# Patient Record
Sex: FEMALE | Race: BLACK OR AFRICAN AMERICAN | NOT HISPANIC OR LATINO | URBAN - METROPOLITAN AREA
[De-identification: names, ages, dates, MRNs, and addresses within clinical notes are randomized per-mention and may not be internally consistent; named-entity substitution may affect disease eponyms.]

---

## 2020-07-25 ENCOUNTER — TELEPHONE ENCOUNTER (OUTPATIENT)
Dept: URBAN - METROPOLITAN AREA CLINIC 13 | Facility: CLINIC | Age: 80
End: 2020-07-25

## 2020-07-25 RX ORDER — PRAVASTATIN SODIUM 40 MG/1
TAKE 1 TABLET DAILY TABLET ORAL
Refills: 0 | OUTPATIENT
Start: 2015-02-13 | End: 2015-06-29

## 2020-07-25 RX ORDER — OMEPRAZOLE 20 MG/1
TAKE 1 CAPSULE BY MOUTH EVERY DAY CAPSULE, DELAYED RELEASE ORAL
Qty: 30 | Refills: 11 | OUTPATIENT
Start: 2019-08-12 | End: 2019-09-05

## 2020-07-25 RX ORDER — ROSUVASTATIN CALCIUM 5 MG
TAKE 1 TABLET DAILY TABLET ORAL
Refills: 0 | OUTPATIENT
End: 2016-12-07

## 2020-07-25 RX ORDER — RALOXIFENE HYDROCHLORIDE 60 MG/1
TAKE 1 TABLET DAILY TABLET, FILM COATED ORAL
Refills: 0 | OUTPATIENT
Start: 2014-09-16 | End: 2017-06-06

## 2020-07-25 RX ORDER — ROSUVASTATIN CALCIUM 5 MG
TABLET ORAL
Qty: 30 | Refills: 0 | OUTPATIENT
Start: 2012-12-17 | End: 2015-05-14

## 2020-07-25 RX ORDER — DICYCLOMINE HYDROCHLORIDE 10 MG/1
TAKE 1 CAPSULE DAILY CAPSULE ORAL
Refills: 0 | OUTPATIENT
End: 2016-12-07

## 2020-07-25 RX ORDER — PANTOPRAZOLE SODIUM 40 MG/1
TAKE 1 TABLET DAILY TABLET, DELAYED RELEASE ORAL
Qty: 90 | Refills: 3 | OUTPATIENT
Start: 2017-02-02 | End: 2019-08-12

## 2020-07-26 ENCOUNTER — TELEPHONE ENCOUNTER (OUTPATIENT)
Dept: URBAN - METROPOLITAN AREA CLINIC 13 | Facility: CLINIC | Age: 80
End: 2020-07-26

## 2020-07-26 RX ORDER — HYDROCODONE BITARTRATE AND ACETAMINOPHEN 5; 325 MG/1; MG/1
TABLET ORAL
Qty: 20 | Refills: 0 | Status: ACTIVE | COMMUNITY
Start: 2015-06-12

## 2020-07-26 RX ORDER — IBUPROFEN 800 MG/1
TAKE 1 TABLET BY MOUTH 3 TIMES A DAY AS NEEDED TABLET ORAL
Qty: 18 | Refills: 0 | Status: ACTIVE | COMMUNITY
Start: 2012-03-24

## 2020-07-26 RX ORDER — TRAMADOL HYDROCHLORIDE AND ACETAMINOPHEN 37.5; 325 MG/1; MG/1
TABLET, FILM COATED ORAL
Qty: 28 | Refills: 0 | Status: ACTIVE | COMMUNITY
Start: 2014-08-14

## 2020-07-26 RX ORDER — LEVOFLOXACIN 500 MG/1
TABLET, FILM COATED ORAL
Qty: 10 | Refills: 0 | Status: ACTIVE | COMMUNITY
Start: 2013-01-16

## 2020-07-26 RX ORDER — NITROFURANTOIN MONOHYDRATE/MACROCRYSTALLINE 25; 75 MG/1; MG/1
CAPSULE ORAL
Qty: 14 | Refills: 0 | Status: ACTIVE | COMMUNITY
Start: 2012-08-17

## 2020-07-26 RX ORDER — CHLORHEXIDINE GLUCONATE 0.12% ORAL RINSE 1.2 MG/ML
SOLUTION ORAL
Qty: 255 | Refills: 0 | Status: ACTIVE | COMMUNITY
Start: 2015-06-12

## 2020-07-26 RX ORDER — AZITHROMYCIN DIHYDRATE 250 MG/1
TABLET, FILM COATED ORAL
Qty: 6 | Refills: 0 | Status: ACTIVE | COMMUNITY
Start: 2014-02-10

## 2020-07-26 RX ORDER — VENLAFAXINE HYDROCHLORIDE 75 MG/1
CAPSULE, EXTENDED RELEASE ORAL
Qty: 30 | Refills: 0 | Status: ACTIVE | COMMUNITY
Start: 2018-10-07

## 2020-07-26 RX ORDER — CLINDAMYCIN HYDROCHLORIDE 300 MG/1
CAPSULE ORAL
Qty: 28 | Refills: 0 | Status: ACTIVE | COMMUNITY
Start: 2015-06-26

## 2020-07-26 RX ORDER — OMEPRAZOLE 20 MG/1
TAKE 1 CAPSULE BY MOUTH EVERY DAY CAPSULE, DELAYED RELEASE ORAL
Qty: 90 | Refills: 4 | Status: ACTIVE | COMMUNITY
Start: 2019-09-05

## 2020-07-26 RX ORDER — AMOXICILLIN 500 MG/1
CAPSULE ORAL
Qty: 21 | Refills: 0 | Status: ACTIVE | COMMUNITY
Start: 2015-06-12

## 2020-07-26 RX ORDER — AMOXICILLIN AND CLAVULANATE POTASSIUM 875; 125 MG/1; MG/1
TAKE 1 TABLET BY MOUTH TWICE A DAY UNTIL FINISHED TABLET, FILM COATED ORAL
Qty: 20 | Refills: 0 | Status: ACTIVE | COMMUNITY
Start: 2012-03-24

## 2020-07-26 RX ORDER — PREDNISONE 20 MG/1
TABLET ORAL
Qty: 6 | Refills: 0 | Status: ACTIVE | COMMUNITY
Start: 2019-07-05

## 2020-07-26 RX ORDER — AMOXICILLIN AND CLAVULANATE POTASSIUM 875; 125 MG/1; MG/1
TABLET, FILM COATED ORAL
Qty: 20 | Refills: 0 | Status: ACTIVE | COMMUNITY
Start: 2012-09-17

## 2020-07-26 RX ORDER — LACTOBACIL 2/BIFIDO 1/S.THERMO 450B CELL
TAKE 1 CAPSULE DAILY PRN PACKET (EA) ORAL
Refills: 0 | Status: ACTIVE | COMMUNITY

## 2020-07-26 RX ORDER — HYDROCODONE BITARTRATE AND ACETAMINOPHEN 5; 325 MG/1; MG/1
TABLET ORAL
Qty: 15 | Refills: 0 | Status: ACTIVE | COMMUNITY
Start: 2014-01-27

## 2020-07-26 RX ORDER — ROSUVASTATIN CALCIUM 5 MG
TABLET ORAL
Qty: 30 | Refills: 0 | Status: ACTIVE | COMMUNITY
Start: 2012-12-17

## 2020-07-26 RX ORDER — RALOXIFENE HCL 60 MG
TABLET ORAL
Qty: 30 | Refills: 0 | Status: ACTIVE | COMMUNITY
Start: 2012-08-23

## 2020-07-26 RX ORDER — RALOXIFENE HCL 60 MG
TABLET ORAL
Qty: 30 | Refills: 0 | Status: ACTIVE | COMMUNITY
Start: 2013-09-10

## 2020-07-26 RX ORDER — IBUPROFEN 800 MG/1
TABLET ORAL
Qty: 18 | Refills: 0 | Status: ACTIVE | COMMUNITY
Start: 2012-09-17

## 2020-07-26 RX ORDER — AMLODIPINE BESYLATE 2.5 MG/1
1 TAB BY MOUTH EVERY DAY FOR BLOOD PRESSURE TABLET ORAL
Qty: 30 | Refills: 0 | Status: ACTIVE | COMMUNITY
Start: 2012-03-03

## 2020-07-26 RX ORDER — ALBUTEROL SULFATE 90 UG/1
AEROSOL, METERED RESPIRATORY (INHALATION)
Qty: 18 | Refills: 0 | Status: ACTIVE | COMMUNITY
Start: 2013-01-16

## 2020-07-26 RX ORDER — MUPIROCIN 20 MG/G
OINTMENT TOPICAL
Qty: 22 | Refills: 0 | Status: ACTIVE | COMMUNITY
Start: 2013-11-05

## 2020-07-26 RX ORDER — GABAPENTIN 100 MG/1
TAKE 2 CAPSULES BY MOUTH TWICE DAILY CAPSULE ORAL
Qty: 40 | Refills: 0 | Status: ACTIVE | COMMUNITY
Start: 2019-07-05

## 2020-07-26 RX ORDER — VALACYCLOVIR HYDROCHLORIDE 500 MG/1
TABLET, FILM COATED ORAL
Qty: 42 | Refills: 0 | Status: ACTIVE | COMMUNITY
Start: 2019-07-05

## 2020-07-26 RX ORDER — AMOXICILLIN 500 MG/1
CAPSULE ORAL
Qty: 30 | Refills: 0 | Status: ACTIVE | COMMUNITY
Start: 2019-01-25

## 2020-07-26 RX ORDER — MONTELUKAST SODIUM 10 MG/1
TABLET, FILM COATED ORAL
Qty: 30 | Refills: 0 | Status: ACTIVE | COMMUNITY
Start: 2019-01-25

## 2020-07-26 RX ORDER — ONDANSETRON 4 MG/1
TAKE 1 TABLET EVERY 8 HOURS PRN NAUSEA TABLET, ORALLY DISINTEGRATING ORAL
Qty: 20 | Refills: 1 | Status: ACTIVE | COMMUNITY
Start: 2019-05-08

## 2020-07-26 RX ORDER — IBUPROFEN 800 MG/1
TABLET ORAL
Qty: 21 | Refills: 0 | Status: ACTIVE | COMMUNITY
Start: 2015-06-12

## 2021-06-17 ENCOUNTER — OFFICE VISIT (OUTPATIENT)
Dept: URBAN - METROPOLITAN AREA CLINIC 107 | Facility: CLINIC | Age: 81
End: 2021-06-17
Payer: MEDICARE

## 2021-06-17 VITALS
TEMPERATURE: 98.2 F | BODY MASS INDEX: 34.23 KG/M2 | HEIGHT: 62 IN | HEART RATE: 93 BPM | WEIGHT: 186 LBS | SYSTOLIC BLOOD PRESSURE: 124 MMHG | RESPIRATION RATE: 18 BRPM | DIASTOLIC BLOOD PRESSURE: 78 MMHG

## 2021-06-17 DIAGNOSIS — F41.8 ANXIETY ABOUT HEALTH: ICD-10-CM

## 2021-06-17 DIAGNOSIS — R07.89 ATYPICAL CHEST PAIN: ICD-10-CM

## 2021-06-17 PROCEDURE — 99213 OFFICE O/P EST LOW 20 MIN: CPT | Performed by: INTERNAL MEDICINE

## 2021-06-17 RX ORDER — TRAMADOL HYDROCHLORIDE AND ACETAMINOPHEN 37.5; 325 MG/1; MG/1
TABLET, FILM COATED ORAL
Qty: 28 | Refills: 0 | Status: ON HOLD | COMMUNITY
Start: 2014-08-14

## 2021-06-17 RX ORDER — VALACYCLOVIR HYDROCHLORIDE 500 MG/1
TABLET, FILM COATED ORAL
Qty: 42 | Refills: 0 | Status: ON HOLD | COMMUNITY
Start: 2019-07-05

## 2021-06-17 RX ORDER — IBUPROFEN 800 MG/1
TAKE 1 TABLET BY MOUTH 3 TIMES A DAY AS NEEDED TABLET ORAL
Qty: 18 | Refills: 0 | Status: ON HOLD | COMMUNITY
Start: 2012-03-24

## 2021-06-17 RX ORDER — HYDROCODONE BITARTRATE AND ACETAMINOPHEN 5; 325 MG/1; MG/1
TABLET ORAL
Qty: 15 | Refills: 0 | Status: ON HOLD | COMMUNITY
Start: 2014-01-27

## 2021-06-17 RX ORDER — OMEPRAZOLE 20 MG/1
TAKE 1 CAPSULE BY MOUTH EVERY DAY CAPSULE, DELAYED RELEASE ORAL ONCE A DAY
Qty: 90 | Refills: 3 | OUTPATIENT

## 2021-06-17 RX ORDER — OMEPRAZOLE 20 MG/1
TAKE 1 CAPSULE BY MOUTH EVERY DAY CAPSULE, DELAYED RELEASE ORAL
Qty: 90 | Refills: 4 | Status: ACTIVE | COMMUNITY
Start: 2019-09-05

## 2021-06-17 RX ORDER — CHLORHEXIDINE GLUCONATE 0.12% ORAL RINSE 1.2 MG/ML
SOLUTION ORAL
Qty: 255 | Refills: 0 | Status: ON HOLD | COMMUNITY
Start: 2015-06-12

## 2021-06-17 RX ORDER — ROSUVASTATIN CALCIUM 5 MG
TABLET ORAL
Qty: 30 | Refills: 0 | Status: ON HOLD | COMMUNITY
Start: 2012-12-17

## 2021-06-17 RX ORDER — TRAZODONE HYDROCHLORIDE 150 MG/1
1 TABLET AT BEDTIME TABLET ORAL ONCE A DAY
Status: ACTIVE | COMMUNITY

## 2021-06-17 RX ORDER — AMLODIPINE BESYLATE 5 MG/1
1 TAB BY MOUTH EVERY DAY FOR BLOOD PRESSURE TABLET ORAL ONCE A DAY
Refills: 0 | Status: ACTIVE | COMMUNITY
Start: 2012-03-03

## 2021-06-17 RX ORDER — MUPIROCIN 20 MG/G
OINTMENT TOPICAL
Qty: 22 | Refills: 0 | Status: ON HOLD | COMMUNITY
Start: 2013-11-05

## 2021-06-17 RX ORDER — AMOXICILLIN AND CLAVULANATE POTASSIUM 875; 125 MG/1; MG/1
TAKE 1 TABLET BY MOUTH TWICE A DAY UNTIL FINISHED TABLET, FILM COATED ORAL
Qty: 20 | Refills: 0 | Status: ON HOLD | COMMUNITY
Start: 2012-03-24

## 2021-06-17 RX ORDER — VENLAFAXINE HYDROCHLORIDE 150 MG/1
1 CAPSULE WITH FOOD CAPSULE, EXTENDED RELEASE ORAL ONCE A DAY
Refills: 0 | Status: ACTIVE | COMMUNITY
Start: 2018-10-07

## 2021-06-17 RX ORDER — CLINDAMYCIN HYDROCHLORIDE 300 MG/1
CAPSULE ORAL
Qty: 28 | Refills: 0 | Status: ON HOLD | COMMUNITY
Start: 2015-06-26

## 2021-06-17 RX ORDER — NITROFURANTOIN MONOHYDRATE/MACROCRYSTALLINE 25; 75 MG/1; MG/1
CAPSULE ORAL
Qty: 14 | Refills: 0 | Status: ON HOLD | COMMUNITY
Start: 2012-08-17

## 2021-06-17 RX ORDER — MONTELUKAST SODIUM 10 MG/1
TABLET, FILM COATED ORAL
Qty: 30 | Refills: 0 | Status: ON HOLD | COMMUNITY
Start: 2019-01-25

## 2021-06-17 RX ORDER — GABAPENTIN 100 MG/1
TAKE 2 CAPSULES BY MOUTH TWICE DAILY CAPSULE ORAL
Qty: 40 | Refills: 0 | Status: ON HOLD | COMMUNITY
Start: 2019-07-05

## 2021-06-17 RX ORDER — RALOXIFENE HCL 60 MG
TABLET ORAL
Qty: 30 | Refills: 0 | Status: ON HOLD | COMMUNITY
Start: 2012-08-23

## 2021-06-17 RX ORDER — LACTOBACIL 2/BIFIDO 1/S.THERMO 450B CELL
TAKE 1 CAPSULE DAILY PRN PACKET (EA) ORAL
Refills: 0 | Status: ON HOLD | COMMUNITY

## 2021-06-17 RX ORDER — PREDNISONE 20 MG/1
TABLET ORAL
Qty: 6 | Refills: 0 | Status: ON HOLD | COMMUNITY
Start: 2019-07-05

## 2021-06-17 RX ORDER — AMOXICILLIN 500 MG/1
CAPSULE ORAL
Qty: 21 | Refills: 0 | Status: ON HOLD | COMMUNITY
Start: 2015-06-12

## 2021-06-17 RX ORDER — AZITHROMYCIN DIHYDRATE 250 MG/1
TABLET, FILM COATED ORAL
Qty: 6 | Refills: 0 | Status: ON HOLD | COMMUNITY
Start: 2014-02-10

## 2021-06-17 RX ORDER — LEVOFLOXACIN 500 MG/1
TABLET, FILM COATED ORAL
Qty: 10 | Refills: 0 | Status: ON HOLD | COMMUNITY
Start: 2013-01-16

## 2021-06-17 RX ORDER — ONDANSETRON 4 MG/1
TAKE 1 TABLET EVERY 8 HOURS PRN NAUSEA TABLET, ORALLY DISINTEGRATING ORAL
Qty: 20 | Refills: 1 | Status: ON HOLD | COMMUNITY
Start: 2019-05-08

## 2021-06-17 RX ORDER — ALBUTEROL SULFATE 90 UG/1
AEROSOL, METERED RESPIRATORY (INHALATION)
Qty: 18 | Refills: 0 | Status: ON HOLD | COMMUNITY
Start: 2013-01-16

## 2021-06-17 NOTE — HPI-TODAY'S VISIT:
80-year-old woman with a history of IBS and reflux, presenting for esophageal irritation and atypical chest pain. She was last seen in the office in August 2019.  She was to resume omeprazole 20 mg daily.  If symptoms were persistent or not improved, an endoscopy was to be considered.  She complains of left sided chest pain radiating to her shoulder and down her left arm. She reports that she has had a cardiac workup, which was normal. She has been taking omeprazole 20 mg daily. This has not made any improvement in her pain. She reports that she has terrible pain in her shoulder and chest that starts ssubsternally. She reports when it radiates down the arm, she is unable to lift her arm. She has not identified any food triggers. Aleve does help the pain somewhat. She reports increased stress with her new neighbors. The [place] [id number] housing community has moved next door. She has been harassed by her neighbors. She is tearful throughout today's visit. She has called the police about all of her troubles, and she was told that there was nothing that could be done since there is not any evidence. She does not want to leave her home. She just wants to live in peace.

## 2021-09-13 ENCOUNTER — OFFICE VISIT (OUTPATIENT)
Dept: URBAN - METROPOLITAN AREA CLINIC 107 | Facility: CLINIC | Age: 81
End: 2021-09-13
Payer: MEDICARE

## 2021-09-13 VITALS
WEIGHT: 182 LBS | SYSTOLIC BLOOD PRESSURE: 151 MMHG | RESPIRATION RATE: 18 BRPM | BODY MASS INDEX: 33.49 KG/M2 | HEIGHT: 62 IN | DIASTOLIC BLOOD PRESSURE: 94 MMHG | HEART RATE: 95 BPM | TEMPERATURE: 98 F

## 2021-09-13 DIAGNOSIS — K21.9 GERD WITHOUT ESOPHAGITIS: ICD-10-CM

## 2021-09-13 DIAGNOSIS — R07.89 ATYPICAL CHEST PAIN: ICD-10-CM

## 2021-09-13 PROBLEM — 102589003: Status: ACTIVE | Noted: 2021-06-17

## 2021-09-13 PROBLEM — 266435005: Status: ACTIVE | Noted: 2021-09-13

## 2021-09-13 PROCEDURE — 99213 OFFICE O/P EST LOW 20 MIN: CPT | Performed by: NURSE PRACTITIONER

## 2021-09-13 RX ORDER — PREDNISONE 20 MG/1
TABLET ORAL
Qty: 6 | Refills: 0 | Status: ON HOLD | COMMUNITY
Start: 2019-07-05

## 2021-09-13 RX ORDER — RALOXIFENE HCL 60 MG
TABLET ORAL
Qty: 30 | Refills: 0 | Status: ON HOLD | COMMUNITY
Start: 2012-08-23

## 2021-09-13 RX ORDER — OMEPRAZOLE 20 MG/1
TAKE 1 CAPSULE BY MOUTH EVERY DAY CAPSULE, DELAYED RELEASE ORAL ONCE A DAY
Qty: 90 | Refills: 3

## 2021-09-13 RX ORDER — ALBUTEROL SULFATE 90 UG/1
AEROSOL, METERED RESPIRATORY (INHALATION)
Qty: 18 | Refills: 0 | Status: ON HOLD | COMMUNITY
Start: 2013-01-16

## 2021-09-13 RX ORDER — VALACYCLOVIR HYDROCHLORIDE 500 MG/1
TABLET, FILM COATED ORAL
Qty: 42 | Refills: 0 | Status: ON HOLD | COMMUNITY
Start: 2019-07-05

## 2021-09-13 RX ORDER — MONTELUKAST SODIUM 10 MG/1
TABLET, FILM COATED ORAL
Qty: 30 | Refills: 0 | Status: ON HOLD | COMMUNITY
Start: 2019-01-25

## 2021-09-13 RX ORDER — AMLODIPINE BESYLATE 5 MG/1
1 TAB BY MOUTH EVERY DAY FOR BLOOD PRESSURE TABLET ORAL ONCE A DAY
Refills: 0 | Status: ACTIVE | COMMUNITY
Start: 2012-03-03

## 2021-09-13 RX ORDER — ROSUVASTATIN CALCIUM 5 MG
TABLET ORAL
Qty: 30 | Refills: 0 | Status: ON HOLD | COMMUNITY
Start: 2012-12-17

## 2021-09-13 RX ORDER — VENLAFAXINE HYDROCHLORIDE 150 MG/1
1 CAPSULE WITH FOOD CAPSULE, EXTENDED RELEASE ORAL ONCE A DAY
Refills: 0 | Status: ACTIVE | COMMUNITY
Start: 2018-10-07

## 2021-09-13 RX ORDER — HYDROCODONE BITARTRATE AND ACETAMINOPHEN 5; 325 MG/1; MG/1
TABLET ORAL
Qty: 15 | Refills: 0 | Status: ON HOLD | COMMUNITY
Start: 2014-01-27

## 2021-09-13 RX ORDER — AZITHROMYCIN DIHYDRATE 250 MG/1
TABLET, FILM COATED ORAL
Qty: 6 | Refills: 0 | Status: ON HOLD | COMMUNITY
Start: 2014-02-10

## 2021-09-13 RX ORDER — OMEPRAZOLE 20 MG/1
TAKE 1 CAPSULE BY MOUTH EVERY DAY CAPSULE, DELAYED RELEASE ORAL ONCE A DAY
Qty: 90 | Refills: 3 | Status: ACTIVE | COMMUNITY

## 2021-09-13 RX ORDER — LEVOFLOXACIN 500 MG/1
TABLET, FILM COATED ORAL
Qty: 10 | Refills: 0 | Status: ON HOLD | COMMUNITY
Start: 2013-01-16

## 2021-09-13 RX ORDER — CHLORHEXIDINE GLUCONATE 0.12% ORAL RINSE 1.2 MG/ML
SOLUTION ORAL
Qty: 255 | Refills: 0 | Status: ON HOLD | COMMUNITY
Start: 2015-06-12

## 2021-09-13 RX ORDER — NITROFURANTOIN MONOHYDRATE/MACROCRYSTALLINE 25; 75 MG/1; MG/1
CAPSULE ORAL
Qty: 14 | Refills: 0 | Status: ON HOLD | COMMUNITY
Start: 2012-08-17

## 2021-09-13 RX ORDER — AMOXICILLIN AND CLAVULANATE POTASSIUM 875; 125 MG/1; MG/1
TAKE 1 TABLET BY MOUTH TWICE A DAY UNTIL FINISHED TABLET, FILM COATED ORAL
Qty: 20 | Refills: 0 | Status: ON HOLD | COMMUNITY
Start: 2012-03-24

## 2021-09-13 RX ORDER — TRAMADOL HYDROCHLORIDE AND ACETAMINOPHEN 37.5; 325 MG/1; MG/1
TABLET, FILM COATED ORAL
Qty: 28 | Refills: 0 | Status: ON HOLD | COMMUNITY
Start: 2014-08-14

## 2021-09-13 RX ORDER — CLINDAMYCIN HYDROCHLORIDE 300 MG/1
CAPSULE ORAL
Qty: 28 | Refills: 0 | Status: ON HOLD | COMMUNITY
Start: 2015-06-26

## 2021-09-13 RX ORDER — AMOXICILLIN 500 MG/1
CAPSULE ORAL
Qty: 21 | Refills: 0 | Status: ON HOLD | COMMUNITY
Start: 2015-06-12

## 2021-09-13 RX ORDER — TRAZODONE HYDROCHLORIDE 150 MG/1
1 TABLET AT BEDTIME TABLET ORAL ONCE A DAY
Status: ACTIVE | COMMUNITY

## 2021-09-13 RX ORDER — ONDANSETRON 4 MG/1
TAKE 1 TABLET EVERY 8 HOURS PRN NAUSEA TABLET, ORALLY DISINTEGRATING ORAL
Qty: 20 | Refills: 1 | Status: ON HOLD | COMMUNITY
Start: 2019-05-08

## 2021-09-13 RX ORDER — GABAPENTIN 100 MG/1
TAKE 2 CAPSULES BY MOUTH TWICE DAILY CAPSULE ORAL
Qty: 40 | Refills: 0 | Status: ON HOLD | COMMUNITY
Start: 2019-07-05

## 2021-09-13 RX ORDER — LACTOBACIL 2/BIFIDO 1/S.THERMO 450B CELL
TAKE 1 CAPSULE DAILY PRN PACKET (EA) ORAL
Refills: 0 | Status: ON HOLD | COMMUNITY

## 2021-09-13 RX ORDER — MUPIROCIN 20 MG/G
OINTMENT TOPICAL
Qty: 22 | Refills: 0 | Status: ON HOLD | COMMUNITY
Start: 2013-11-05

## 2021-09-13 RX ORDER — IBUPROFEN 800 MG/1
TAKE 1 TABLET BY MOUTH 3 TIMES A DAY AS NEEDED TABLET ORAL
Qty: 18 | Refills: 0 | Status: ON HOLD | COMMUNITY
Start: 2012-03-24

## 2021-09-13 NOTE — HPI-TODAY'S VISIT:
80 yo female with a history of IBS and acid reflux, presenting for medication refill.  She tells me that she is doing fairly well. She continues with emotional/social  struggles with her neighbors. She denies any trouble swallowing. She does continue with acid reflux with chest pain radiating down her arm. She tells me that she had a complete physical with her PCP, and was told that she is healthy with the exception of arthritis. Her stress is exacerbating the majority of her issues.  There is rare heartburn. She has been taking omeprazole 20 mg daily. There is no abdominal pain, nausea or vomiting. Her bowels are moving normally. No blood per rectum. Her weight loss. Her appetite is fair.

## 2021-11-04 ENCOUNTER — TELEPHONE ENCOUNTER (OUTPATIENT)
Dept: URBAN - METROPOLITAN AREA CLINIC 113 | Facility: CLINIC | Age: 81
End: 2021-11-04

## 2021-11-04 RX ORDER — OMEPRAZOLE 20 MG/1
TAKE 1 CAPSULE BY MOUTH EVERY DAY CAPSULE, DELAYED RELEASE ORAL ONCE A DAY
Qty: 90 | Refills: 3

## 2023-04-28 ENCOUNTER — OFFICE VISIT (OUTPATIENT)
Dept: URBAN - METROPOLITAN AREA CLINIC 113 | Facility: CLINIC | Age: 83
End: 2023-04-28
Payer: MEDICARE

## 2023-04-28 ENCOUNTER — DASHBOARD ENCOUNTERS (OUTPATIENT)
Age: 83
End: 2023-04-28

## 2023-04-28 VITALS
HEART RATE: 97 BPM | SYSTOLIC BLOOD PRESSURE: 122 MMHG | HEIGHT: 62 IN | RESPIRATION RATE: 18 BRPM | BODY MASS INDEX: 33.23 KG/M2 | WEIGHT: 180.6 LBS | TEMPERATURE: 97.5 F | DIASTOLIC BLOOD PRESSURE: 75 MMHG

## 2023-04-28 DIAGNOSIS — K21.9 GERD WITHOUT ESOPHAGITIS: ICD-10-CM

## 2023-04-28 PROCEDURE — 99213 OFFICE O/P EST LOW 20 MIN: CPT | Performed by: NURSE PRACTITIONER

## 2023-04-28 RX ORDER — VALACYCLOVIR HYDROCHLORIDE 500 MG/1
TABLET, FILM COATED ORAL
Qty: 42 | Refills: 0 | Status: ON HOLD | COMMUNITY
Start: 2019-07-05

## 2023-04-28 RX ORDER — MONTELUKAST SODIUM 10 MG/1
TABLET, FILM COATED ORAL
Qty: 30 | Refills: 0 | Status: ON HOLD | COMMUNITY
Start: 2019-01-25

## 2023-04-28 RX ORDER — HYDROCODONE BITARTRATE AND ACETAMINOPHEN 5; 325 MG/1; MG/1
TABLET ORAL
Qty: 15 | Refills: 0 | Status: ON HOLD | COMMUNITY
Start: 2014-01-27

## 2023-04-28 RX ORDER — PREDNISONE 20 MG/1
TABLET ORAL
Qty: 6 | Refills: 0 | Status: ON HOLD | COMMUNITY
Start: 2019-07-05

## 2023-04-28 RX ORDER — TRAMADOL HYDROCHLORIDE AND ACETAMINOPHEN 37.5; 325 MG/1; MG/1
TABLET, FILM COATED ORAL
Qty: 28 | Refills: 0 | Status: ON HOLD | COMMUNITY
Start: 2014-08-14

## 2023-04-28 RX ORDER — IBUPROFEN 800 MG/1
TAKE 1 TABLET BY MOUTH 3 TIMES A DAY AS NEEDED TABLET ORAL
Qty: 18 | Refills: 0 | Status: ON HOLD | COMMUNITY
Start: 2012-03-24

## 2023-04-28 RX ORDER — OMEPRAZOLE 20 MG/1
TAKE 1 CAPSULE BY MOUTH EVERY DAY CAPSULE, DELAYED RELEASE ORAL ONCE A DAY
Qty: 90 | Refills: 3 | Status: ACTIVE | COMMUNITY

## 2023-04-28 RX ORDER — ONDANSETRON 4 MG/1
TAKE 1 TABLET EVERY 8 HOURS PRN NAUSEA TABLET, ORALLY DISINTEGRATING ORAL
Qty: 20 | Refills: 1 | Status: ON HOLD | COMMUNITY
Start: 2019-05-08

## 2023-04-28 RX ORDER — NITROFURANTOIN MONOHYDRATE/MACROCRYSTALLINE 25; 75 MG/1; MG/1
CAPSULE ORAL
Qty: 14 | Refills: 0 | Status: ON HOLD | COMMUNITY
Start: 2012-08-17

## 2023-04-28 RX ORDER — OMEPRAZOLE 20 MG/1
TAKE 1 CAPSULE BY MOUTH EVERY DAY CAPSULE, DELAYED RELEASE ORAL ONCE A DAY
Qty: 90 | Refills: 3

## 2023-04-28 RX ORDER — AMLODIPINE BESYLATE 5 MG/1
1 TAB BY MOUTH EVERY DAY FOR BLOOD PRESSURE TABLET ORAL ONCE A DAY
Refills: 0 | Status: ACTIVE | COMMUNITY
Start: 2012-03-03

## 2023-04-28 RX ORDER — ROSUVASTATIN CALCIUM 5 MG
TABLET ORAL
Qty: 30 | Refills: 0 | Status: ON HOLD | COMMUNITY
Start: 2012-12-17

## 2023-04-28 RX ORDER — AMOXICILLIN 500 MG/1
CAPSULE ORAL
Qty: 21 | Refills: 0 | Status: ON HOLD | COMMUNITY
Start: 2015-06-12

## 2023-04-28 RX ORDER — VENLAFAXINE HYDROCHLORIDE 150 MG/1
1 CAPSULE WITH FOOD CAPSULE, EXTENDED RELEASE ORAL ONCE A DAY
Refills: 0 | Status: ACTIVE | COMMUNITY
Start: 2018-10-07

## 2023-04-28 RX ORDER — RALOXIFENE HCL 60 MG
TABLET ORAL
Qty: 30 | Refills: 0 | Status: ON HOLD | COMMUNITY
Start: 2012-08-23

## 2023-04-28 RX ORDER — ALBUTEROL SULFATE 90 UG/1
AEROSOL, METERED RESPIRATORY (INHALATION)
Qty: 18 | Refills: 0 | Status: ON HOLD | COMMUNITY
Start: 2013-01-16

## 2023-04-28 RX ORDER — ATORVASTATIN CALCIUM 40 MG/1
1 TABLET TABLET, FILM COATED ORAL ONCE A DAY
Status: ACTIVE | COMMUNITY

## 2023-04-28 RX ORDER — LEVOFLOXACIN 500 MG/1
TABLET, FILM COATED ORAL
Qty: 10 | Refills: 0 | Status: ON HOLD | COMMUNITY
Start: 2013-01-16

## 2023-04-28 RX ORDER — MUPIROCIN 20 MG/G
OINTMENT TOPICAL
Qty: 22 | Refills: 0 | Status: ON HOLD | COMMUNITY
Start: 2013-11-05

## 2023-04-28 RX ORDER — AZITHROMYCIN DIHYDRATE 250 MG/1
TABLET, FILM COATED ORAL
Qty: 6 | Refills: 0 | Status: ON HOLD | COMMUNITY
Start: 2014-02-10

## 2023-04-28 RX ORDER — TRAZODONE HYDROCHLORIDE 150 MG/1
1 TABLET AT BEDTIME TABLET ORAL ONCE A DAY
Status: ACTIVE | COMMUNITY

## 2023-04-28 RX ORDER — CLINDAMYCIN HYDROCHLORIDE 300 MG/1
CAPSULE ORAL
Qty: 28 | Refills: 0 | Status: ON HOLD | COMMUNITY
Start: 2015-06-26

## 2023-04-28 RX ORDER — AMOXICILLIN AND CLAVULANATE POTASSIUM 875; 125 MG/1; MG/1
TAKE 1 TABLET BY MOUTH TWICE A DAY UNTIL FINISHED TABLET, FILM COATED ORAL
Qty: 20 | Refills: 0 | Status: ON HOLD | COMMUNITY
Start: 2012-03-24

## 2023-04-28 RX ORDER — LACTOBACIL 2/BIFIDO 1/S.THERMO 450B CELL
TAKE 1 CAPSULE DAILY PRN PACKET (EA) ORAL
Refills: 0 | Status: ON HOLD | COMMUNITY

## 2023-04-28 RX ORDER — CHLORHEXIDINE GLUCONATE 0.12% ORAL RINSE 1.2 MG/ML
SOLUTION ORAL
Qty: 255 | Refills: 0 | Status: ON HOLD | COMMUNITY
Start: 2015-06-12

## 2023-04-28 RX ORDER — GABAPENTIN 100 MG/1
TAKE 2 CAPSULES BY MOUTH TWICE DAILY CAPSULE ORAL
Qty: 40 | Refills: 0 | Status: ON HOLD | COMMUNITY
Start: 2019-07-05

## 2023-04-28 RX ORDER — CLOPIDOGREL 75 MG/1
1 TABLET TABLET, FILM COATED ORAL ONCE A DAY
Status: ACTIVE | COMMUNITY

## 2023-04-28 NOTE — HPI-TODAY'S VISIT:
82-year-old woman presenting for long interval follow-up regarding atypical chest pain and GERD, as well as medication refill. She was seen in the office in September 2021.  She was noted to have a history of IBS and acid reflux.  She was doing well on omeprazole 20 mg by mouth daily.  She continued to struggle with emotional and social issues with her neighbors.  She was not having any trouble swallowing.  Majority of her atypical chest pain was exacerbated by stress component.  She has had a stroke on 4/9/23. SHe was hospitalized at [facility] from 4/9-4/13. She has mostly recovered fully. She is doing well. She is otherwise doing okay. She is moving to [place], [place]. She is asymptomatic